# Patient Record
Sex: FEMALE | Race: BLACK OR AFRICAN AMERICAN
[De-identification: names, ages, dates, MRNs, and addresses within clinical notes are randomized per-mention and may not be internally consistent; named-entity substitution may affect disease eponyms.]

---

## 2017-05-25 ENCOUNTER — HOSPITAL ENCOUNTER (EMERGENCY)
Dept: HOSPITAL 74 - JER | Age: 27
Discharge: HOME | End: 2017-05-25
Payer: COMMERCIAL

## 2017-05-25 VITALS — BODY MASS INDEX: 44.9 KG/M2

## 2017-05-25 DIAGNOSIS — Z79.84: ICD-10-CM

## 2017-05-25 DIAGNOSIS — E11.65: ICD-10-CM

## 2017-05-25 DIAGNOSIS — N39.0: Primary | ICD-10-CM

## 2017-05-25 LAB
APPEARANCE UR: CLEAR
BILIRUB UR STRIP.AUTO-MCNC: NEGATIVE MG/DL
COLOR UR: (no result)
KETONES UR QL STRIP: NEGATIVE
LEUKOCYTE ESTERASE UR QL STRIP.AUTO: (no result)
MUCOUS THREADS URNS QL MICRO: (no result)
NITRITE UR QL STRIP: NEGATIVE
PH UR: 5 [PH] (ref 5–8)
PROT UR QL STRIP: (no result)
PROT UR QL STRIP: NEGATIVE
RBC # BLD AUTO: 1 /HPF (ref 0–3)
RBC # UR STRIP: (no result) /UL
UROBILINOGEN UR STRIP-MCNC: NEGATIVE E.U./DL (ref 0.2–1)
WBC # UR AUTO: 20 /HPF (ref 3–5)

## 2017-05-25 NOTE — PDOC
History of Present Illness





<Julia Barlow - Last Filed: 05/25/17 22:53>





- General


History Source: Patient


Exam Limitations: No Limitations





- History of Present Illness


Initial Comments: 





05/25/17 22:55


The patient is a 27 year old female with significant past medical history of 

diabetes who presents to the ED for 1 week of progressively worsening frequency 

and urgency. Patient reports her frequency and urgency worsen 3 days ago. Today 

she developed left groin pain and low back pain. Denies dysuria and hematuria. 

States her last UTI was around the age of 14. Her LMP was few days ago. 


 


The patient denies fever, chills, cough, SOB, chest pain, and palpitations.


The patient denies nausea, vomiting, and diarrhea.


 


Allergies: NKDA


Social History: No alcohol, tobacco, or drug use reported. 


Past Surgical History: C-sections x3, ovarian cyst removal, tubal ligation 


PCP: None reported 








<Teresa Varela - Last Filed: 05/25/17 22:55>





<Concha Wilson - Last Filed: 05/26/17 01:21>





- General


Chief Complaint: Urinary Problem


Stated Complaint: PELVIC PAIN


Time Seen by Provider: 05/25/17 22:19





Past History





- Past Medical History


Diabetes: Yes





- Psycho/Social/Smoking Cessation Hx


Anxiety: No


Suicidal Ideation: No


Smoking History: Never smoked


Have you smoked in the past 12 months: No


Information on smoking cessation initiated: No


Hx Alcohol Use: No


Drug/Substance Use Hx: No


Substance Use Type: None





<Julia Barlow - Last Filed: 05/25/17 22:53>





<Teresa Varela - Last Filed: 05/25/17 22:55>





<Concha Wilson - Last Filed: 05/26/17 01:21>





- Past Medical History


Allergies/Adverse Reactions: 


 Allergies











Allergy/AdvReac Type Severity Reaction Status Date / Time


 


No Known Allergies Allergy   Verified 02/12/17 22:39











Home Medications: 


Ambulatory Orders





Metformin HCl [Glucophage -] 850 mg PO BID 02/12/17 


Levofloxacin [Levaquin -] 500 mg PO DAILY #4 tablet 05/26/17 











**Review of Systems





- Review of Systems


Able to Perform ROS?: Yes


Comments:: 





05/25/17 22:55


GENERAL/CONSTITUTIONAL: No fever or chills. No weakness.


HEAD, EYES, EARS, NOSE AND THROAT: No change in vision. No ear pain or 

discharge. No sore throat.


CARDIOVASCULAR: No chest pain or shortness of breath.


RESPIRATORY: No cough, wheezing, or hemoptysis.


GASTROINTESTINAL: +left groin pain No nausea, vomiting, diarrhea or 

constipation.


GENITOURINARY: +frequency, urgency No dysuria


MUSCULOSKELETAL: +low back pain No joint or muscle swelling or pain. No neck 

pain.


SKIN: No rash


NEUROLOGIC: No headache, vertigo, loss of consciousness, or change in strength/

sensation.


ENDOCRINE: No increased thirst. No abnormal weight change.


HEMATOLOGIC/LYMPHATIC: No anemia, easy bleeding, or history of blood clots.


ALLERGIC/IMMUNOLOGIC: No hives or skin allergy.








<Teresa Varela - Last Filed: 05/25/17 22:55>





*Physical Exam





- Vital Signs


 Last Vital Signs











Temp Pulse Resp BP Pulse Ox


 


 97.9 F   70   18   143/80   99 


 


 05/25/17 21:54  05/25/17 21:54  05/25/17 21:54  05/25/17 21:54  05/25/17 21:54














<Julia Barlow - Last Filed: 05/25/17 22:53>





- Vital Signs


 Last Vital Signs











Temp Pulse Resp BP Pulse Ox


 


 97.9 F   70   18   143/80   99 


 


 05/25/17 21:54  05/25/17 21:54  05/25/17 21:54  05/25/17 21:54  05/25/17 21:54














- Physical Exam


Comments: 





05/25/17 22:55


GENERAL: Awake, alert, and fully oriented, in no acute distress


HEAD: No signs of trauma


EYES: PERRLA, EOMI, sclera anicteric, conjunctiva clear


ENT: Auricles normal inspection, hearing grossly normal, nares patent, 

oropharynx clear without exudates. Moist mucosa


NECK: Normal ROM, supple, no lymphadenopathy, JVD, or masses


LUNGS: Breath sounds equal, clear to auscultation bilaterally.  No wheezes, and 

no crackles


HEART: Regular rate and rhythm, normal S1 and S2, no murmurs, rubs or gallops


ABDOMEN: Soft, mild left lower quadrant tenderness and suprapubic tenderness, 

normoactive bowel sounds.  No guarding, no rebound.  No masses


EXTREMITIES: Normal range of motion, no edema.  No clubbing or cyanosis. No 

cords, erythema, or tenderness


NEUROLOGICAL: Cranial nerves II through XII grossly intact.  Normal speech, 

normal gait


SKIN: Warm, Dry, normal turgor, no rashes or lesions noted.








<Teresa Varela - Last Filed: 05/25/17 22:55>





- Vital Signs


 Last Vital Signs











Temp Pulse Resp BP Pulse Ox


 


 97.9 F   70   18   143/80   99 


 


 05/25/17 21:54  05/25/17 21:54  05/25/17 21:54  05/25/17 21:54  05/25/17 21:54














<Concha Wilson - Last Filed: 05/26/17 01:21>





ED Treatment Course





- ADDITIONAL ORDERS


Additional order review: 


 Laboratory  Results











  05/25/17





  22:39


 


Urine Color  Straw


 


Urine Appearance  Clear


 


Urine pH  5.0


 


Urine Protein  Negative


 


Urine Glucose (UA)  3+ H


 


Urine Ketones  Negative


 


Urine Blood  1+ H


 


Urine Nitrite  Negative


 


Urine Bilirubin  Negative


 


Urine Urobilinogen  Negative


 


Ur Leukocyte Esterase  Trace H


 


Urine RBC  1


 


Urine WBC  20


 


Ur Epithelial Cells  Rare


 


Urine Mucus  Rare


 


Urine HCG, Qual  Negative














<Concha Wilson - Last Filed: 05/26/17 01:21>





Medical Decision Making





- Medical Decision Making





05/26/17 01:19


Pt has minimal suprapubic tenderness. I will treat UTI with levaquin.  She has 

20 WBC and leuk + urine.  She has an elevated blood sugar, but pt will take her 

levemir at home.  Stable for discharge.  Follow with PMD.





<Concha Wilson - Last Filed: 05/26/17 01:21>





*DC/Admit/Observation/Transfer





<Julia Barlow - Last Filed: 05/25/17 22:53>





- Attestations


Scribe Attestion: 





05/25/17 22:55





Documentation prepared by Teresa Varela, acting as medical scribe for 

Julia Barlow MD, MD/DO.





<Teresa Varela - Last Filed: 05/25/17 22:55>





- Discharge Dispostion


Admit: No





<Concha Wilson - Last Filed: 05/26/17 01:21>


Diagnosis at time of Disposition: 


 UTI (urinary tract infection)





- Discharge Dispostion


Disposition: HOME


Condition at time of disposition: Stable





- Prescriptions


Prescriptions: 


Levofloxacin [Levaquin -] 500 mg PO DAILY #4 tablet





- Patient Instructions


Printed Discharge Instructions:  Urinary Tract Infection

## 2017-05-26 VITALS — SYSTOLIC BLOOD PRESSURE: 131 MMHG | HEART RATE: 65 BPM | DIASTOLIC BLOOD PRESSURE: 77 MMHG | TEMPERATURE: 98.3 F

## 2017-05-26 LAB — SP GR UR: 1.01 (ref 1–1.02)

## 2017-05-29 NOTE — PDOC
Patient Follow-up (Call Back)





- Post ED Follow - Up


Condition at time of discharge: Stable


Disposition at time of original discharge: HOME





- Disposition


Additional Instructions/Notes: 


+ urine culture, on levaquin, susceptible.

## 2017-08-09 ENCOUNTER — HOSPITAL ENCOUNTER (EMERGENCY)
Dept: HOSPITAL 74 - JER | Age: 27
Discharge: HOME | End: 2017-08-09
Payer: COMMERCIAL

## 2017-08-09 VITALS — TEMPERATURE: 98.4 F | SYSTOLIC BLOOD PRESSURE: 146 MMHG | DIASTOLIC BLOOD PRESSURE: 89 MMHG | HEART RATE: 76 BPM

## 2017-08-09 VITALS — BODY MASS INDEX: 35.5 KG/M2

## 2017-08-09 DIAGNOSIS — E11.9: ICD-10-CM

## 2017-08-09 DIAGNOSIS — R10.2: Primary | ICD-10-CM

## 2017-08-09 LAB
ALBUMIN SERPL-MCNC: 3.4 G/DL (ref 3.4–5)
ALP SERPL-CCNC: 67 U/L (ref 45–117)
ALT SERPL-CCNC: 34 U/L (ref 12–78)
ANION GAP SERPL CALC-SCNC: 11 MMOL/L (ref 8–16)
APPEARANCE UR: CLEAR
AST SERPL-CCNC: 13 U/L (ref 15–37)
BASOPHILS # BLD: 0.7 % (ref 0–2)
BILIRUB SERPL-MCNC: 0.2 MG/DL (ref 0.2–1)
BILIRUB UR STRIP.AUTO-MCNC: NEGATIVE MG/DL
CALCIUM SERPL-MCNC: 9.1 MG/DL (ref 8.5–10.1)
CO2 SERPL-SCNC: 26 MMOL/L (ref 21–32)
COLOR UR: (no result)
CREAT SERPL-MCNC: 0.8 MG/DL (ref 0.55–1.02)
DEPRECATED RDW RBC AUTO: 13.5 % (ref 11.6–15.6)
EOSINOPHIL # BLD: 1.9 % (ref 0–4.5)
GLUCOSE SERPL-MCNC: 348 MG/DL (ref 74–106)
KETONES UR QL STRIP: (no result)
LEUKOCYTE ESTERASE UR QL STRIP.AUTO: NEGATIVE
MCH RBC QN AUTO: 27.5 PG (ref 25.7–33.7)
MCHC RBC AUTO-ENTMCNC: 32.8 G/DL (ref 32–36)
MCV RBC: 84 FL (ref 80–96)
NEUTROPHILS # BLD: 55.7 % (ref 42.8–82.8)
NITRITE UR QL STRIP: NEGATIVE
PH UR: 5 [PH] (ref 5–8)
PLATELET # BLD AUTO: 142 K/MM3 (ref 134–434)
PLATELET # BLD EST: ADEQUATE 10*3/UL
PMV BLD: 13 FL (ref 7.5–11.1)
PROT SERPL-MCNC: 6.7 G/DL (ref 6.4–8.2)
PROT UR QL STRIP: (no result)
PROT UR QL STRIP: NEGATIVE
RBC # UR STRIP: NEGATIVE /UL
SP GR UR: <= 1.005 (ref 1–1.02)
UROBILINOGEN UR STRIP-MCNC: NEGATIVE MG/DL (ref 0.2–1)
WBC # BLD AUTO: 8.1 K/MM3 (ref 4–10)

## 2017-08-09 PROCEDURE — 3E013VG INTRODUCTION OF INSULIN INTO SUBCUTANEOUS TISSUE, PERCUTANEOUS APPROACH: ICD-10-PCS

## 2017-08-09 PROCEDURE — 3E03329 INTRODUCTION OF OTHER ANTI-INFECTIVE INTO PERIPHERAL VEIN, PERCUTANEOUS APPROACH: ICD-10-PCS

## 2017-08-09 NOTE — PDOC
History of Present Illness





- General


Chief Complaint: Pain


Stated Complaint: LOWER PELVIC PAIN


Time Seen by Provider: 08/09/17 14:42


History Source: Patient


Exam Limitations: No Limitations





- History of Present Illness


Initial Comments: 


This is a 27 female with h/o recent bacterial vaginosis (finished metronidazole 

course on 4 days ago), recent vaginal yeast infection (took antifungal pill on 

two days ago), NIDDM, ovarian cystectomy, bilateral tubal ligation, and C-

section x3 who p/w RLQ and right flank pain for the past two days. It has 

fluctuated up to 8/10 and feels like cramping pressure, with occasional sharper 

shooting pain in the RLQ that radiates to her groin and down the front of her 

right thigh. It improves with resting on her left side, and worsens with heavy 

lifting. She has been lifting her son frequently, and also has been lifting 

heavy boxes of chickens at work. She took Tylenol yesterday for the pain. She 

notes similar prior episodes of pain in the same area just before she had a 

right ovarian cyst removed. She additionally notes white vaginal discharge, 

recent urinary frequency, pink coloration yesterday, and urgency resulting in 

leaking urine. She has had nausea and urge to defecate today as well, but 

denies any fever, chills, vomiting, diarrhea, constipation, painful urination, 

new numbness, new tingling, headache, new dizziness, black or bloody stool, or 

other symptoms.





Past History





- Past Medical History


Allergies/Adverse Reactions: 


 Allergies











Allergy/AdvReac Type Severity Reaction Status Date / Time


 


No Known Allergies Allergy   Verified 08/09/17 13:39











Home Medications: 


Ambulatory Orders





Metformin HCl [Glucophage -] 500 mg PO DAILY 02/12/17 








Diabetes: Yes


Other medical history: recent bacterial vaginosis





- Surgical History


Appendectomy: No


Cholecystectomy: No


Other Surgical History: 


right ovarian cyst removal, bilateral tubal ligation





- Reproductive History


LMP comment: LMP 7/23/17





- Psycho/Social/Smoking Cessation Hx


Anxiety: No


Suicidal Ideation: No


Smoking History: Never smoked


Have you smoked in the past 12 months: No


Information on smoking cessation initiated: No


Hx Alcohol Use: No


Drug/Substance Use Hx: No


Substance Use Type: None





**Review of Systems





- Review of Systems


Able to Perform ROS?: Yes


Constitutional: No: Chills, Fever, Unexplained wgt Loss


HEENTM: No: Nose Congestion, Throat Pain


Respiratory: No: Cough, Shortness of Breath


Cardiac (ROS): No: Chest Pain, Lightheadedness, Palpitations


ABD/GI: Yes: Nausea, Other (abdominal pain).  No: Constipated, Diarrhea, 

Vomiting


: Yes: Discharge (white), Frequency, Flank Pain (right), Hematuria, Pain (

vaginal), Urgency.  No: Burning, Dysuria


Musculoskeletal: No: Muscle Weakness, Neck Pain


Integumentary: No: Bruising, Rash


Neurological: No: Headache, Numbness, Tingling, Weakness, Dizziness


Endocrine: No: Unexplained Weight Gain, Unexplained Weight Loss





*Physical Exam





- Vital Signs


 Last Vital Signs











Temp Pulse Resp BP Pulse Ox


 


 98.4 F   76   18   146/89   100 


 


 08/09/17 13:40  08/09/17 13:40  08/09/17 13:40  08/09/17 13:40  08/09/17 13:40














- Physical Exam


General Appearance: Yes: Nourished, Appropriately Dressed, Obese, Other (

pleasant young woman with two young children in the room with her, nontoxic 

appearing, conversive and calm).  No: Apparent Distress


HEENT: positive: EOMI, Normal Voice, Hearing Grossly Normal.  negative: Scleral 

Icterus (R), Scleral Icterus (L), Nasal Congestion


Neck: positive: Trachea midline, Supple.  negative: Tender, Rigid


Respiratory/Chest: positive: Lungs Clear, Normal Breath Sounds.  negative: 

Respiratory Distress, Crackles, Rhonchi, Stridor, Wheezing


Cardiovascular: positive: Regular Rhythm, Regular Rate.  negative: Edema, Murmur


Female Pelvic Exam: positive: cervical os closed, CMT, discharge (copious thick 

white discharge with curdled appearance and slight pink tinge), adnexal 

tenderness (right), other (external exam with raw and mildly irritated skin to 

bilateral labia majora)


Gastrointestinal/Abdominal: positive: Normal Bowel Sounds, Tender (mild diffuse 

tenderness with moderate tenderness to RLQ with tenderness at McBurney's point 

and positive Kaye's sign, no rebound tenderness), Soft.  negative: 

Organomegaly, Pulsatile Mass, Guarding


Musculoskeletal: positive: Normal Inspection, CVA Tenderness (R).  negative: 

CVA Tenderness (L), Decreased Range of Motion, Vertebral Tenderness


Extremity: positive: Normal Capillary Refill, Normal Inspection, Normal Range 

of Motion.  negative: Tender, Cyanosis


Integumentary: positive: Normal Color, Dry, Warm.  negative: Erythema, Rash, 

Bruising


Neurologic: positive: CNs II-XII NML intact, Fully Oriented, Alert, Normal Mood/

Affect, Normal Response, Motor Strength 5/5, Finger to Nose (normal)





ED Treatment Course





- LABORATORY


CBC & Chemistry Diagram: 


 08/09/17 15:43





 08/09/17 15:43





Medical Decision Making





- Medical Decision Making


26 yo female with NIDDM, recent BV, recent vaginal yeast infection p/w RLQ and 

pelvic pain.


Exam notable for diffuse ttp worse in the RLQ, right CVA tenderness.


Pelvic exam with copious discharge, CMT, and right adnexal tenderness.


DDX includes ovarian cyst, vaginitis, PID, UTI/pyelo, appendicitis, 

cholecystitis, pancreatitis, sciatica, or muscle strain/sprain.


Ordered are CBCD, CMP, lipase, UA +cx, US transvaginal, GC/Chlam/Trich and RPR, 

CT Ab/Pelv WWO contrast.





 Laboratory Tests











  08/09/17 08/09/17 08/09/17





  15:43 15:43 15:43


 


WBC  8.1  


 


RBC  4.73  


 


Hgb  13.0  


 


Hct  39.8  


 


MCV  84.0  


 


MCH  27.5  


 


MCHC  32.8  


 


RDW  13.5  


 


Plt Count  142  


 


MPV  13.0 H  


 


Neutrophils %  55.7  


 


Lymphocytes %  32.9  D  


 


Monocytes %  8.8  


 


Eosinophils %  1.9  


 


Basophils %  0.7  


 


Differential Comment  Slide scanned  


 


Platelet Estimate  Adequate  


 


Sodium    136


 


Potassium    4.2


 


Chloride    99


 


Carbon Dioxide    26


 


Anion Gap    11


 


BUN    9  D


 


Creatinine    0.8


 


Creat Clearance w eGFR    > 60


 


Random Glucose    348 H*


 


Calcium    9.1


 


Total Bilirubin    0.2  D


 


AST    13 L


 


ALT    34


 


Alkaline Phosphatase    67


 


Total Protein    6.7


 


Albumin    3.4


 


Lipase    153


 


Serum Pregnancy, Qual   


 


Urine Color   Straw 


 


Urine Appearance   Clear 


 


Urine pH   5.0 


 


Ur Specific Gravity   <= 1.005 


 


Urine Protein   Negative 


 


Urine Glucose (UA)   3+ H 


 


Urine Ketones   Trace H 


 


Urine Blood   Negative 


 


Urine Nitrite   Negative 


 


Urine Bilirubin   Negative 


 


Urine Urobilinogen   Negative 


 


Ur Leukocyte Esterase   Negative 














  08/09/17





  17:46


 


WBC 


 


RBC 


 


Hgb 


 


Hct 


 


MCV 


 


MCH 


 


MCHC 


 


RDW 


 


Plt Count 


 


MPV 


 


Neutrophils % 


 


Lymphocytes % 


 


Monocytes % 


 


Eosinophils % 


 


Basophils % 


 


Differential Comment 


 


Platelet Estimate 


 


Sodium 


 


Potassium 


 


Chloride 


 


Carbon Dioxide 


 


Anion Gap 


 


BUN 


 


Creatinine 


 


Creat Clearance w eGFR 


 


Random Glucose 


 


Calcium 


 


Total Bilirubin 


 


AST 


 


ALT 


 


Alkaline Phosphatase 


 


Total Protein 


 


Albumin 


 


Lipase 


 


Serum Pregnancy, Qual  Negative


 


Urine Color 


 


Urine Appearance 


 


Urine pH 


 


Ur Specific Gravity 


 


Urine Protein 


 


Urine Glucose (UA) 


 


Urine Ketones 


 


Urine Blood 


 


Urine Nitrite 


 


Urine Bilirubin 


 


Urine Urobilinogen 


 


Ur Leukocyte Esterase 








UA without e/o UTI.


Serum pregnancy negative.


CBCD unremarkable, CMP shows hyperglycemia but the patient is asymptomatic.


Lipase is negative.


Transvaginal US reported as normal, ovaries not visualized.


Pt awaiting CT abdomen/pelvis WWO contrast (IV and PO).


Signed out to oncoming resident, Dr. Rodger Palacios.





*DC/Admit/Observation/Transfer


Diagnosis at time of Disposition: 


 Pain in pelvis





- Attestations


Physician Attestion: 


I, Dr. Kimmy Coy, attest that this document has been prepared under my 

direction and personally reviewed by me in its entirety.   I further attest, 

that it accurately reflects all work, treatment, procedures and medical decision

-making performed by me.

## 2017-08-09 NOTE — PDOC
Attending Attestation





- Resident


Resident Name: Kimmy Coy





- ED Attending Attestation


I have performed the following: I have examined & evaluated the patient, The 

case was reviewed & discussed with the resident, I agree w/resident's findings 

& plan, Exceptions are as noted





- HPI


HPI: 


08/09/17 15:10


28yo F hx NIDDM, bacterial vaginosis, s/p treatment last wk (finished saturday)

, b/l tubal ligation p/w RLQ pain x2 days. Pain radiates to R flank and down R 

thigh. Pain is worse with heavy lifting. Feels like a previous ovarian cyst. 

LMP 7/23/17. Also reports urinary frequency and urgency. +nausea





08/09/17 15:17








- Physicial Exam


PE: 





08/09/17 15:18


GENERAL: Awake, alert, and fully oriented, in no acute distress


HEAD: No signs of trauma


EYES: PERRLA, EOMI, sclera anicteric, conjunctiva clear


ENT: Auricles normal inspection, hearing grossly normal, nares patent, 

oropharynx clear without exudates. Moist mucosa


NECK: Normal ROM, supple, no lymphadenopathy, JVD, or masses


LUNGS: Breath sounds equal, clear to auscultation bilaterally.  No wheezes, and 

no crackles


HEART: Regular rate and rhythm, normal S1 and S2, no murmurs, rubs or gallops


ABDOMEN: Soft, normoactive bowel sounds.  No guarding, no rebound.  No masses. 

R sided abdominal pain RLQ>RUQ. 


EXTREMITIES: Normal range of motion, no edema.  No clubbing or cyanosis. No 

cords, erythema, or tenderness


NEUROLOGICAL:  Normal speech, cranial nerves intact, negative pronator drift, 5/

5 strength in all 4 extremities, normal sensation to light touch in all 4 

extremities, normal cerebellar exam, normal gait, normal reflexes and tone


SKIN: Warm, Dry, normal turgor, no rashes or lesions noted.








- Medical Decision Making


08/09/17 15:19





27-year-old female presents with right lower quadrant pain and tenderness. 

Differential is wide and includes ectopic pregnancy although unlikely given 

history of tubal ligation versus ruptured ovarian cyst versus PID versus 

appendicitis versus UTI.


-labs


-UA


-TVUS


-consider CTAP


-pain control


-reassess





08/10/17 20:05

## 2017-08-09 NOTE — PDOC
*Physical Exam





- Vital Signs


 Last Vital Signs











Temp Pulse Resp BP Pulse Ox


 


 98.4 F   76   18   146/89   100 


 


 08/09/17 13:40  08/09/17 13:40  08/09/17 13:40  08/09/17 13:40  08/09/17 13:40














- Physical Exam


Comments: 





08/09/17 22:12


pt endorsed to me by Dr. Flaherty/Tai.  Patient is a 27-year-old female with 

history of diabetes (noncompliant with her insolent regimen) who presents to 

the ER with atraumatic lower abdominal pain. In the ER, patient is awake and 

alert, nontoxic-appearing, afebrile. CBC is within normal limit. CMP reveals 

hyperglycemia of 359 with no evidence of increased anion gap. Significant only 

decreased sodium bicarbonate. Transvaginal ultrasound revealed no adnexal masses

, ovaries were not adequately visualized. CT of abdomen and pelvis with by 

mouth and IV contrast revealed no evidence of acute appendicitis or any other 

intra-abdominal pathology. Pelvic exam performed by Dr. Meneses reveal large 

amount of purulent vaginal discharge as well as CMT and right adnexal 

tenderness. PID suspected. We will treat with ceftriaxone and doxycycline. Will 

obtain RPR to be followed up by OB/GYN. Will discharge.





ED Treatment Course





- LABORATORY


CBC & Chemistry Diagram: 


 08/09/17 15:43





 08/09/17 15:43





- ADDITIONAL ORDERS


Additional order review: 


 Laboratory  Results











  08/09/17 08/09/17 08/09/17





  17:46 15:43 15:43


 


Sodium   136 


 


Potassium   4.2 


 


Chloride   99 


 


Carbon Dioxide   26 


 


Anion Gap   11 


 


BUN   9  D 


 


Creatinine   0.8 


 


Creat Clearance w eGFR   > 60 


 


Random Glucose   348 H* 


 


Calcium   9.1 


 


Total Bilirubin   0.2  D 


 


AST   13 L 


 


ALT   34 


 


Alkaline Phosphatase   67 


 


Total Protein   6.7 


 


Albumin   3.4 


 


Lipase   153 


 


Serum Pregnancy, Qual  Negative  


 


Urine Color    Straw


 


Urine Appearance    Clear


 


Urine pH    5.0


 


Ur Specific Gravity    <= 1.005


 


Urine Protein    Negative


 


Urine Glucose (UA)    3+ H


 


Urine Ketones    Trace H


 


Urine Blood    Negative


 


Urine Nitrite    Negative


 


Urine Bilirubin    Negative


 


Urine Urobilinogen    Negative


 


Ur Leukocyte Esterase    Negative








 











  08/09/17





  15:43


 


RBC  4.73


 


MCV  84.0


 


MCHC  32.8


 


RDW  13.5


 


MPV  13.0 H


 


Neutrophils %  55.7


 


Lymphocytes %  32.9  D


 


Monocytes %  8.8


 


Eosinophils %  1.9


 


Basophils %  0.7














*DC/Admit/Observation/Transfer


Diagnosis at time of Disposition: 


 Acute pelvic inflammatory disease, Hyperglycemia without ketosis





Abdominal pain


Qualifiers:


 Abdominal location: right lower quadrant Qualified Code(s): R10.31 - Right 

lower quadrant pain





- Discharge Dispostion


Disposition: HOME


Condition at time of disposition: Stable





- Referrals


Referrals: 


Cy Jaimes [Primary Care Provider] - 


Gerardo Haynes MD [Staff Physician] - 





- Patient Instructions


Printed Discharge Instructions:  DI for Pelvic Inflammatory Disease, DI for 

Abdominal Pain-Adult, DI for Hyperglycemia -- Adult





- Post Discharge Activity

## 2019-08-06 ENCOUNTER — HOSPITAL ENCOUNTER (EMERGENCY)
Dept: HOSPITAL 74 - JER | Age: 29
LOS: 1 days | Discharge: HOME | End: 2019-08-07
Payer: COMMERCIAL

## 2019-08-06 DIAGNOSIS — Z79.84: ICD-10-CM

## 2019-08-06 DIAGNOSIS — E11.9: ICD-10-CM

## 2019-08-06 DIAGNOSIS — N39.0: ICD-10-CM

## 2019-08-06 DIAGNOSIS — N76.0: Primary | ICD-10-CM

## 2022-02-01 ENCOUNTER — HOSPITAL ENCOUNTER (EMERGENCY)
Dept: HOSPITAL 74 - JER | Age: 32
LOS: 1 days | Discharge: HOME | End: 2022-02-02
Payer: COMMERCIAL

## 2022-02-01 VITALS — BODY MASS INDEX: 44.4 KG/M2

## 2022-02-01 VITALS — TEMPERATURE: 97.7 F

## 2022-02-01 DIAGNOSIS — R00.2: Primary | ICD-10-CM

## 2022-02-01 LAB
ALBUMIN SERPL-MCNC: 3.4 G/DL (ref 3.4–5)
ALP SERPL-CCNC: 89 U/L (ref 45–117)
ALT SERPL-CCNC: 33 U/L (ref 13–61)
ANION GAP SERPL CALC-SCNC: 9 MMOL/L (ref 8–16)
AST SERPL-CCNC: 28 U/L (ref 15–37)
BASOPHILS # BLD: 0.6 % (ref 0–2)
BILIRUB SERPL-MCNC: 0.3 MG/DL (ref 0.2–1)
BNP SERPL-MCNC: 10.3 PG/ML (ref 5–125)
BUN SERPL-MCNC: 12.4 MG/DL (ref 7–18)
CALCIUM SERPL-MCNC: 9.3 MG/DL (ref 8.5–10.1)
CHLORIDE SERPL-SCNC: 102 MMOL/L (ref 98–107)
CO2 SERPL-SCNC: 27 MMOL/L (ref 21–32)
CREAT SERPL-MCNC: 0.8 MG/DL (ref 0.55–1.3)
DEPRECATED RDW RBC AUTO: 13.2 % (ref 11.6–15.6)
EOSINOPHIL # BLD: 2.5 % (ref 0–4.5)
GLUCOSE SERPL-MCNC: 292 MG/DL (ref 74–106)
HCT VFR BLD CALC: 40 % (ref 32.4–45.2)
HGB BLD-MCNC: 12.5 GM/DL (ref 10.7–15.3)
INR BLD: 0.95 (ref 0.83–1.09)
LYMPHOCYTES # BLD: 44.1 % (ref 8–40)
MCH RBC QN AUTO: 26.3 PG (ref 25.7–33.7)
MCHC RBC AUTO-ENTMCNC: 31.3 G/DL (ref 32–36)
MCV RBC: 84 FL (ref 80–96)
MONOCYTES # BLD AUTO: 8 % (ref 3.8–10.2)
NEUTROPHILS # BLD: 44.8 % (ref 42.8–82.8)
PLATELET # BLD AUTO: 194 10^3/UL (ref 134–434)
PMV BLD: 12 FL (ref 7.5–11.1)
PROT SERPL-MCNC: 7 G/DL (ref 6.4–8.2)
PT PNL PPP: 10.9 SEC (ref 9.7–13)
RBC # BLD AUTO: 4.76 M/MM3 (ref 3.6–5.2)
SODIUM SERPL-SCNC: 138 MMOL/L (ref 136–145)
WBC # BLD AUTO: 5.9 K/MM3 (ref 4–10)

## 2022-02-02 VITALS — SYSTOLIC BLOOD PRESSURE: 132 MMHG | HEART RATE: 86 BPM | DIASTOLIC BLOOD PRESSURE: 78 MMHG

## 2022-11-17 ENCOUNTER — HOSPITAL ENCOUNTER (EMERGENCY)
Dept: HOSPITAL 74 - FER | Age: 32
LOS: 1 days | Discharge: HOME | End: 2022-11-18
Payer: COMMERCIAL

## 2022-11-17 VITALS
TEMPERATURE: 98 F | HEART RATE: 78 BPM | SYSTOLIC BLOOD PRESSURE: 152 MMHG | DIASTOLIC BLOOD PRESSURE: 91 MMHG | RESPIRATION RATE: 18 BRPM

## 2022-11-17 VITALS — BODY MASS INDEX: 41.5 KG/M2

## 2022-11-17 DIAGNOSIS — U07.1: Primary | ICD-10-CM
